# Patient Record
Sex: FEMALE | Race: WHITE | URBAN - METROPOLITAN AREA
[De-identification: names, ages, dates, MRNs, and addresses within clinical notes are randomized per-mention and may not be internally consistent; named-entity substitution may affect disease eponyms.]

---

## 2017-02-18 ENCOUNTER — OFFICE VISIT (OUTPATIENT)
Dept: URGENT CARE | Facility: RETAIL CLINIC | Age: 32
End: 2017-02-18
Payer: COMMERCIAL

## 2017-02-18 VITALS — SYSTOLIC BLOOD PRESSURE: 120 MMHG | TEMPERATURE: 98 F | DIASTOLIC BLOOD PRESSURE: 80 MMHG | HEART RATE: 78 BPM

## 2017-02-18 DIAGNOSIS — J02.9 ACUTE PHARYNGITIS, UNSPECIFIED ETIOLOGY: Primary | ICD-10-CM

## 2017-02-18 LAB — S PYO AG THROAT QL IA.RAPID: NEGATIVE

## 2017-02-18 PROCEDURE — 87081 CULTURE SCREEN ONLY: CPT | Performed by: PHYSICIAN ASSISTANT

## 2017-02-18 PROCEDURE — 87880 STREP A ASSAY W/OPTIC: CPT | Mod: QW | Performed by: PHYSICIAN ASSISTANT

## 2017-02-18 PROCEDURE — 99202 OFFICE O/P NEW SF 15 MIN: CPT | Performed by: PHYSICIAN ASSISTANT

## 2017-02-18 NOTE — PROGRESS NOTES
Chief Complaint   Patient presents with     Throat Pain     2 weeks     Otalgia     2 weeks     Generalized Body Aches     neck        SUBJECTIVE:  Mariah Barrientos is a 31 year old female with a chief complaint of sore throat.  Onset of symptoms was 2 week(s) ago.    Course of illness: gradual onset, on and off  Severity mild  Current and Associated symptoms: sore throat, ear discomfort, neck  (R>L), neck achy, swollen gland on R side past 2 days, slight nasal congestion  Treatment measures tried include ibuprofen  Predisposing factors include s/p tonsillectomy, had strep in 2011, is a teacher, doesn't get strep much though    No past medical history on file.  Current Outpatient Prescriptions   Medication Sig Dispense Refill     IBUPROFEN PO           Allergies   Allergen Reactions     Pertussis Vaccines         History   Smoking Status     Never Smoker   Smokeless Tobacco     Not on file       ROS:  Review of systems negative except as stated above.    OBJECTIVE:   /80 (BP Location: Left arm)  Pulse 78  Temp 98  F (36.7  C) (Oral)  GENERAL APPEARANCE: healthy, alert and no distress  EYES:  conjunctiva clear  HENT: ear canals clear. R TM gray with small amt of serous effusion. L TM pearly gray .  Nose mild congestion.  Pharynx mildly erythematous. Tonsils absent  NECK: supple, tender to palpation, small adenopathy noted  RESP: lungs clear to auscultation - no rales, rhonchi or wheezes  CV: regular rates and rhythm, normal S1 S2, no murmur noted  SKIN: no suspicious lesions or rashes    Rapid Strep test is negative; await throat culture results.    ASSESSMENT:  Acute pharyngitis, unspecified etiology    PLAN:   Rapid strep test today is negative.   Your throat culture is pending. [facility] will call you if positive results to start antibiotics at that time.  No phone call if strep culture is negative  For middle ear fluid-  sudafed/warm compresses  Symptomatic treat with fluids, rest, salt water gargles,  lozenges, and over the counter pain reliever, such as tylenol or ibuprofen as needed.   Please follow up with primary care provider if not improving, worsening or new symptoms     Sarah Dorsey PA-C  [facility] [place]

## 2017-02-18 NOTE — PATIENT INSTRUCTIONS
Rapid strep test today is negative.   Your throat culture is pending. [facility] will call you if positive results to start antibiotics at that time.  No phone call if strep culture is negative  For middle ear fluid-  sudafed/warm compresses  Symptomatic treat with fluids, rest, salt water gargles, lozenges, and over the counter pain reliever, such as tylenol or ibuprofen as needed.   Please follow up with primary care provider if not improving, worsening or new symptoms

## 2017-02-18 NOTE — NURSING NOTE
"Chief Complaint   Patient presents with     Throat Pain     2 weeks     Otalgia     2 weeks     Generalized Body Aches     neck        Initial /80 (BP Location: Left arm)  Pulse 78  Temp 98  F (36.7  C) (Oral) Estimated body mass index is 32.28 kg/(m^2) as calculated from the following:    Height as of 7/28/12: 5' 6\" (1.676 m).    Weight as of 7/28/12: 200 lb (90.7 kg).  Medication Reconciliation: complete  "

## 2017-02-18 NOTE — MR AVS SNAPSHOT
After Visit Summary   2/18/2017    Mariah Barrientos    MRN: [id number]           Patient Information     Date Of Birth          1985        Visit Information        Provider Department      2/18/2017 11:00 AM Sarah Dorsey PA-C [facility] [place]        Today's Diagnoses     Acute pharyngitis, unspecified etiology    -  1      Care Instructions    Rapid strep test today is negative.   Your throat culture is pending. [facility] will call you if positive results to start antibiotics at that time.  No phone call if strep culture is negative  For middle ear fluid-  sudafed/warm compresses  Symptomatic treat with fluids, rest, salt water gargles, lozenges, and over the counter pain reliever, such as tylenol or ibuprofen as needed.   Please follow up with primary care provider if not improving, worsening or new symptoms         Follow-ups after your visit        Who to contact     You can reach your care team any time of the day by calling [phone].  Notification of test results:  If you have an abnormal lab result, we will notify you by phone as soon as possible.         Additional Information About Your Visit        MyChart Information     [employer] gives you secure access to your electronic health record. If you see a primary care provider, you can also send messages to your care team and make appointments. If you have questions, please call your primary care clinic.  If you do not have a primary care provider, please call [phone] and they will assist you.        Care EveryWhere ID     This is your Care EveryWhere ID. This could be used by other organizations to access your [facility] medical records  [id number]        Your Vitals Were     Pulse Temperature                78 98  F (36.7  C) (Oral)           Blood Pressure from Last 3 Encounters:   02/18/17 120/80   07/28/12 110/72   05/24/11 132/78    Weight from Last 3 Encounters:   07/28/12 200 lb (90.7 kg)               We Performed the Following     BETA STREP GROUP A R/O CULTURE     RAPID STREP SCREEN        Primary Care Provider    None Specified       No primary provider on file.        Thank you!     Thank you for choosing [facility]  for your care. Our goal is always to provide you with excellent care. Hearing back from our patients is one way we can continue to improve our services. Please take a few minutes to complete the written survey that you may receive in the mail after your visit with us. Thank you!             Your Updated Medication List - Protect others around you: Learn how to safely use, store and throw away your medicines at www.disposemymeds.org.          This list is accurate as of: 2/18/17 11:20 AM.  Always use your most recent med list.                   Brand Name Dispense Instructions for use    IBUPROFEN PO

## 2017-02-21 LAB — BETA STREP CONFIRM: NORMAL

## 2017-10-29 ENCOUNTER — HEALTH MAINTENANCE LETTER (OUTPATIENT)
Age: 32
End: 2017-10-29

## 2020-03-01 ENCOUNTER — HEALTH MAINTENANCE LETTER (OUTPATIENT)
Age: 35
End: 2020-03-01

## 2020-12-14 ENCOUNTER — HEALTH MAINTENANCE LETTER (OUTPATIENT)
Age: 35
End: 2020-12-14

## 2021-04-17 ENCOUNTER — HEALTH MAINTENANCE LETTER (OUTPATIENT)
Age: 36
End: 2021-04-17

## 2021-10-02 ENCOUNTER — HEALTH MAINTENANCE LETTER (OUTPATIENT)
Age: 36
End: 2021-10-02

## 2022-05-14 ENCOUNTER — HEALTH MAINTENANCE LETTER (OUTPATIENT)
Age: 37
End: 2022-05-14

## 2022-09-03 ENCOUNTER — HEALTH MAINTENANCE LETTER (OUTPATIENT)
Age: 37
End: 2022-09-03

## 2023-06-03 ENCOUNTER — HEALTH MAINTENANCE LETTER (OUTPATIENT)
Age: 38
End: 2023-06-03